# Patient Record
Sex: FEMALE | Race: WHITE | NOT HISPANIC OR LATINO | Employment: FULL TIME | URBAN - METROPOLITAN AREA
[De-identification: names, ages, dates, MRNs, and addresses within clinical notes are randomized per-mention and may not be internally consistent; named-entity substitution may affect disease eponyms.]

---

## 2023-09-01 ENCOUNTER — HOSPITAL ENCOUNTER (OUTPATIENT)
Facility: HOSPITAL | Age: 38
Discharge: HOME | End: 2023-09-01
Attending: STUDENT IN AN ORGANIZED HEALTH CARE EDUCATION/TRAINING PROGRAM
Payer: MEDICAID

## 2023-09-01 VITALS
TEMPERATURE: 97.8 F | DIASTOLIC BLOOD PRESSURE: 90 MMHG | BODY MASS INDEX: 33.13 KG/M2 | HEIGHT: 62 IN | RESPIRATION RATE: 17 BRPM | WEIGHT: 180 LBS | HEART RATE: 67 BPM | SYSTOLIC BLOOD PRESSURE: 131 MMHG | OXYGEN SATURATION: 98 %

## 2023-09-01 DIAGNOSIS — J06.9 UPPER RESPIRATORY TRACT INFECTION, UNSPECIFIED TYPE: Primary | ICD-10-CM

## 2023-09-01 LAB
FLUAV SUBTYP SPEC NAA+PROBE: NOT DETECTED
FLUBV RNA ISLT QL NAA+PROBE: NOT DETECTED
SARS-COV-2 RNA RESP QL NAA+PROBE: NOT DETECTED
STREP A PCR: NOT DETECTED

## 2023-09-01 PROCEDURE — 87636 SARSCOV2 & INF A&B AMP PRB: CPT

## 2023-09-01 PROCEDURE — G0463 HOSPITAL OUTPT CLINIC VISIT: HCPCS

## 2023-09-01 PROCEDURE — 87651 STREP A DNA AMP PROBE: CPT

## 2023-09-01 RX ORDER — ZOLPIDEM TARTRATE 5 MG/1
5 TABLET ORAL NIGHTLY PRN
COMMUNITY

## 2023-09-01 RX ORDER — METHYLPREDNISOLONE 4 MG/1
TABLET ORAL
Qty: 21 TABLET | Refills: 0 | Status: SHIPPED | OUTPATIENT
Start: 2023-09-01

## 2023-09-01 RX ORDER — FLUOXETINE HYDROCHLORIDE 20 MG/1
40 CAPSULE ORAL DAILY
COMMUNITY

## 2023-09-01 RX ORDER — DOXYCYCLINE 100 MG/1
100 CAPSULE ORAL 2 TIMES DAILY
Qty: 14 CAPSULE | Refills: 0 | Status: SHIPPED | OUTPATIENT
Start: 2023-09-01 | End: 2023-09-08

## 2023-09-01 NOTE — FSED PROVIDER NOTE
Subjective   History of Present Illness  Patient 37-year-old female who presents for cough, congestion, ear pain x2 days.  Reports some burning chest pain and sore throat with coughing episodes.  Fever onset today with associated chills and body aches controlled with Tylenol at home.  Some diarrhea.  Denies abdominal pain, chest pain, shortness of breath.  No history of heart or lung disease.      Review of Systems   Constitutional:  Positive for chills and fever.   HENT:  Positive for congestion, ear pain, rhinorrhea and sore throat. Negative for ear discharge.    Eyes:  Negative for visual disturbance.   Respiratory:  Positive for cough and chest tightness. Negative for shortness of breath.    Cardiovascular:  Negative for chest pain and leg swelling.   Gastrointestinal:  Positive for diarrhea. Negative for abdominal pain, constipation, nausea and vomiting.   Musculoskeletal:  Positive for myalgias. Negative for arthralgias and gait problem.   Skin:  Negative for color change, pallor, rash and wound.     Past Medical History:   Diagnosis Date    Anxiety     Depression     Insomnia        No Known Allergies    Past Surgical History:   Procedure Laterality Date    HYSTERECTOMY      TUBAL ABDOMINAL LIGATION         History reviewed. No pertinent family history.    Social History     Socioeconomic History    Marital status: Single   Substance and Sexual Activity    Alcohol use: Yes           Objective   Physical Exam  Constitutional:       General: She is not in acute distress.     Appearance: She is normal weight. She is not ill-appearing, toxic-appearing or diaphoretic.   HENT:      Right Ear: Ear canal and external ear normal.      Left Ear: Tympanic membrane, ear canal and external ear normal.      Ears:      Comments: View of right TM largely obstructed due to copious cerumen.     Nose: Congestion present.      Mouth/Throat:      Mouth: Mucous membranes are moist.      Pharynx: Oropharynx is clear. No  oropharyngeal exudate or posterior oropharyngeal erythema.   Cardiovascular:      Rate and Rhythm: Normal rate and regular rhythm.   Pulmonary:      Effort: Pulmonary effort is normal. No respiratory distress.      Breath sounds: Normal breath sounds. No wheezing.   Musculoskeletal:         General: Normal range of motion.   Skin:     General: Skin is warm and dry.   Neurological:      Mental Status: She is alert.   Psychiatric:         Mood and Affect: Mood normal.         Behavior: Behavior normal.       Procedures           ED Course  ED Course as of 09/01/23 1436   Fri Sep 01, 2023   1348 COVID19: Not Detected [AS]   1348 Influenza A PCR: Not Detected [AS]   1348 Influenza B PCR: Not Detected [AS]   1348 STREP A PCR: Not Detected [AS]      ED Course User Index  [AS] Zabrina Pitt, PA-C                                           Medical Decision Making  Patient is well-appearing 37-year-old female.  Nontoxic.  No acute distress.  Vital signs WNL.  Exam is unremarkable.  History of upper respiratory symptoms and exam with negative COVID, flu, strep is Most consistent with viral upper respiratory infection.  Discussed this with the patient.  Recommended steroid pack for symptoms.  Will prescribe antibiotic if symptoms turn bacterial, such as if the patient has sinus pain or pressure, ear drainage, etc.  We will send patient with printed prescription to use in a few days.  Patient verbalized understanding and was agreeable to plan and discharge.  Answered all questions.  Discussed when to return to the emergency department.    My differential diagnosis for cough includes but is not limited to:  Upper respiratory infection, bronchitis, pneumonia, COPD exacerbation, cough variant asthma, cardiac asthma, coronary artery disease, congestive heart failure, bacterial, viral or lung infections, lung cancer, aspiration pneumonitis, aspiration of foreign body and Covid -19      Problems Addressed:  Upper respiratory tract  infection, unspecified type: complicated acute illness or injury    Amount and/or Complexity of Data Reviewed  Labs:  Decision-making details documented in ED Course.    Risk  Prescription drug management.        Final diagnoses:   Upper respiratory tract infection, unspecified type       ED Disposition  ED Disposition       ED Disposition   Discharge    Condition   Stable    Comment   --               Provider, No Known  [facility] [place] [address]    Schedule an appointment as soon as possible for a visit            Medication List        New Prescriptions      doxycycline 100 MG capsule  Commonly known as: MONODOX  Take 1 capsule by mouth 2 (Two) Times a Day for 7 days.     methylPREDNISolone 4 MG dose pack  Commonly known as: MEDROL  6 tabs PO x1 day, 5 tabs PO x1 day, and continue decrease of 1 tablet/day.  6 days total treatment.               Where to Get Your Medications        These medications were sent to [facility] [address] [place] [address] [place]  [address] [place] [address] - [phone]  - [phone]   [address] [place] [address] [place] [address]      Phone: [phone]   methylPREDNISolone 4 MG dose pack       You can get these medications from any pharmacy    Bring a paper prescription for each of these medications  doxycycline 100 MG capsule

## 2023-09-01 NOTE — DISCHARGE INSTRUCTIONS
Take steroid pack as prescribed until completion for symptoms.  Monitor symptoms for 2 to 3 days, and start antibiotic and take until completion if symptoms worsen or evidence of bacterial infection as discussed.  Return to emergency department for worsening symptoms or other medical emergencies.  Recommended follow-up with PCP.  Refer to the attached instructions for further information.

## 2024-01-08 ENCOUNTER — APPOINTMENT (OUTPATIENT)
Dept: GENERAL RADIOLOGY | Facility: HOSPITAL | Age: 39
End: 2024-01-08
Payer: COMMERCIAL

## 2024-01-08 ENCOUNTER — HOSPITAL ENCOUNTER (EMERGENCY)
Facility: HOSPITAL | Age: 39
Discharge: HOME | End: 2024-01-08
Attending: EMERGENCY MEDICINE | Admitting: EMERGENCY MEDICINE
Payer: COMMERCIAL

## 2024-01-08 VITALS
BODY MASS INDEX: 33.49 KG/M2 | OXYGEN SATURATION: 96 % | HEIGHT: 62 IN | HEART RATE: 82 BPM | DIASTOLIC BLOOD PRESSURE: 82 MMHG | TEMPERATURE: 98.3 F | SYSTOLIC BLOOD PRESSURE: 115 MMHG | RESPIRATION RATE: 18 BRPM | WEIGHT: 182 LBS

## 2024-01-08 DIAGNOSIS — M54.6 ACUTE LEFT-SIDED THORACIC BACK PAIN: Primary | ICD-10-CM

## 2024-01-08 DIAGNOSIS — R07.89 CHEST DISCOMFORT: ICD-10-CM

## 2024-01-08 LAB
ALBUMIN SERPL-MCNC: 4.4 G/DL (ref 3.5–5.2)
ALBUMIN/GLOB SERPL: 1.8 G/DL
ALP SERPL-CCNC: 72 U/L (ref 39–117)
ALT SERPL W P-5'-P-CCNC: 42 U/L (ref 1–33)
ANION GAP SERPL CALCULATED.3IONS-SCNC: 9.8 MMOL/L (ref 5–15)
AST SERPL-CCNC: 20 U/L (ref 1–32)
BASOPHILS # BLD AUTO: 0.02 10*3/MM3 (ref 0–0.2)
BASOPHILS NFR BLD AUTO: 0.4 % (ref 0–1.5)
BILIRUB SERPL-MCNC: 0.3 MG/DL (ref 0–1.2)
BUN SERPL-MCNC: 13 MG/DL (ref 6–20)
BUN/CREAT SERPL: 14.1 (ref 7–25)
CALCIUM SPEC-SCNC: 9.4 MG/DL (ref 8.6–10.5)
CHLORIDE SERPL-SCNC: 105 MMOL/L (ref 98–107)
CO2 SERPL-SCNC: 24.2 MMOL/L (ref 22–29)
CREAT SERPL-MCNC: 0.92 MG/DL (ref 0.57–1)
D DIMER PPP FEU-MCNC: 0.22 MCGFEU/ML (ref 0–0.5)
DEPRECATED RDW RBC AUTO: 39.6 FL (ref 37–54)
EGFRCR SERPLBLD CKD-EPI 2021: 80.9 ML/MIN/1.73
EOSINOPHIL # BLD AUTO: 0.1 10*3/MM3 (ref 0–0.4)
EOSINOPHIL NFR BLD AUTO: 2 % (ref 0.3–6.2)
ERYTHROCYTE [DISTWIDTH] IN BLOOD BY AUTOMATED COUNT: 12.1 % (ref 12.3–15.4)
GLOBULIN UR ELPH-MCNC: 2.4 GM/DL
GLUCOSE SERPL-MCNC: 104 MG/DL (ref 65–99)
HCT VFR BLD AUTO: 39.9 % (ref 34–46.6)
HGB BLD-MCNC: 13.2 G/DL (ref 12–15.9)
HOLD SPECIMEN: NORMAL
HOLD SPECIMEN: NORMAL
IMM GRANULOCYTES # BLD AUTO: 0 10*3/MM3 (ref 0–0.05)
IMM GRANULOCYTES NFR BLD AUTO: 0 % (ref 0–0.5)
LYMPHOCYTES # BLD AUTO: 1.62 10*3/MM3 (ref 0.7–3.1)
LYMPHOCYTES NFR BLD AUTO: 32 % (ref 19.6–45.3)
MCH RBC QN AUTO: 29.5 PG (ref 26.6–33)
MCHC RBC AUTO-ENTMCNC: 33.1 G/DL (ref 31.5–35.7)
MCV RBC AUTO: 89.3 FL (ref 79–97)
MONOCYTES # BLD AUTO: 0.43 10*3/MM3 (ref 0.1–0.9)
MONOCYTES NFR BLD AUTO: 8.5 % (ref 5–12)
NEUTROPHILS NFR BLD AUTO: 2.89 10*3/MM3 (ref 1.7–7)
NEUTROPHILS NFR BLD AUTO: 57.1 % (ref 42.7–76)
PLATELET # BLD AUTO: 200 10*3/MM3 (ref 140–450)
PMV BLD AUTO: 10 FL (ref 6–12)
POTASSIUM SERPL-SCNC: 4.2 MMOL/L (ref 3.5–5.2)
PROT SERPL-MCNC: 6.8 G/DL (ref 6–8.5)
QT INTERVAL: 355 MS
QTC INTERVAL: 434 MS
RBC # BLD AUTO: 4.47 10*6/MM3 (ref 3.77–5.28)
SODIUM SERPL-SCNC: 139 MMOL/L (ref 136–145)
TROPONIN T SERPL HS-MCNC: <6 NG/L
WBC NRBC COR # BLD AUTO: 5.06 10*3/MM3 (ref 3.4–10.8)
WHOLE BLOOD HOLD COAG: NORMAL
WHOLE BLOOD HOLD SPECIMEN: NORMAL

## 2024-01-08 PROCEDURE — 85025 COMPLETE CBC W/AUTO DIFF WBC: CPT | Performed by: EMERGENCY MEDICINE

## 2024-01-08 PROCEDURE — 96374 THER/PROPH/DIAG INJ IV PUSH: CPT

## 2024-01-08 PROCEDURE — 80053 COMPREHEN METABOLIC PANEL: CPT | Performed by: EMERGENCY MEDICINE

## 2024-01-08 PROCEDURE — 93005 ELECTROCARDIOGRAM TRACING: CPT | Performed by: EMERGENCY MEDICINE

## 2024-01-08 PROCEDURE — 84484 ASSAY OF TROPONIN QUANT: CPT | Performed by: EMERGENCY MEDICINE

## 2024-01-08 PROCEDURE — 71045 X-RAY EXAM CHEST 1 VIEW: CPT

## 2024-01-08 PROCEDURE — 85379 FIBRIN DEGRADATION QUANT: CPT | Performed by: EMERGENCY MEDICINE

## 2024-01-08 PROCEDURE — 99284 EMERGENCY DEPT VISIT MOD MDM: CPT

## 2024-01-08 PROCEDURE — 25010000002 KETOROLAC TROMETHAMINE PER 15 MG: Performed by: EMERGENCY MEDICINE

## 2024-01-08 RX ORDER — HYDROCODONE BITARTRATE AND ACETAMINOPHEN 5; 325 MG/1; MG/1
1 TABLET ORAL ONCE
Status: COMPLETED | OUTPATIENT
Start: 2024-01-08 | End: 2024-01-08

## 2024-01-08 RX ORDER — NAPROXEN 500 MG/1
500 TABLET ORAL 2 TIMES DAILY PRN
Qty: 10 TABLET | Refills: 0 | Status: SHIPPED | OUTPATIENT
Start: 2024-01-08 | End: 2024-01-13

## 2024-01-08 RX ORDER — SODIUM CHLORIDE 0.9 % (FLUSH) 0.9 %
10 SYRINGE (ML) INJECTION AS NEEDED
Status: DISCONTINUED | OUTPATIENT
Start: 2024-01-08 | End: 2024-01-08 | Stop reason: HOSPADM

## 2024-01-08 RX ORDER — CYCLOBENZAPRINE HCL 5 MG
5-10 TABLET ORAL 3 TIMES DAILY PRN
Qty: 20 TABLET | Refills: 0 | Status: SHIPPED | OUTPATIENT
Start: 2024-01-08

## 2024-01-08 RX ORDER — KETOROLAC TROMETHAMINE 15 MG/ML
15 INJECTION, SOLUTION INTRAMUSCULAR; INTRAVENOUS ONCE
Status: COMPLETED | OUTPATIENT
Start: 2024-01-08 | End: 2024-01-08

## 2024-01-08 RX ORDER — CYCLOBENZAPRINE HCL 10 MG
10 TABLET ORAL ONCE
Status: COMPLETED | OUTPATIENT
Start: 2024-01-08 | End: 2024-01-08

## 2024-01-08 RX ADMIN — HYDROCODONE BITARTRATE AND ACETAMINOPHEN 1 TABLET: 5; 325 TABLET ORAL at 09:18

## 2024-01-08 RX ADMIN — CYCLOBENZAPRINE 10 MG: 10 TABLET, FILM COATED ORAL at 09:19

## 2024-01-08 RX ADMIN — KETOROLAC TROMETHAMINE 15 MG: 15 INJECTION, SOLUTION INTRAMUSCULAR; INTRAVENOUS at 09:26

## 2024-01-08 NOTE — FSED PROVIDER NOTE
Subjective   History of Present Illness  Patient reports having left scapular and shoulder pain and spasms, worse with certain movements and manipulation of the left shoulder.  This does radiate into her left chest.  She does have a family history of cardiac disease.  She denies shortness of breath.  There is no injury or trauma or known overuse.  This woke her up from sleep at 1 AM.      Review of Systems    Past Medical History:   Diagnosis Date    Anxiety     Depression     Insomnia        No Known Allergies    Past Surgical History:   Procedure Laterality Date    HYSTERECTOMY      TUBAL ABDOMINAL LIGATION         History reviewed. No pertinent family history.    Social History     Socioeconomic History    Marital status: Single   Substance and Sexual Activity    Alcohol use: Yes           Objective   Physical Exam  Vitals and nursing note reviewed.   Constitutional:       Appearance: Normal appearance. She is obese.   HENT:      Head: Normocephalic.      Right Ear: External ear normal.      Left Ear: External ear normal.      Nose: Nose normal.   Eyes:      Conjunctiva/sclera: Conjunctivae normal.   Cardiovascular:      Rate and Rhythm: Normal rate.      Heart sounds: Normal heart sounds.   Pulmonary:      Effort: Pulmonary effort is normal.      Breath sounds: Normal breath sounds.   Abdominal:      General: There is no distension.   Musculoskeletal:      Cervical back: Normal range of motion.      Comments: Passive manipulation of the shoulder worsens her left shoulder girdle pain   Skin:     Findings: No rash.   Neurological:      Mental Status: She is alert and oriented to person, place, and time.   Psychiatric:         Mood and Affect: Mood normal.         Procedures           ED Course                                           Medical Decision Making  Workup looks overall reassuring here in the ER.  Physical exam indicates this is likely musculoskeletal in nature.  She felt better with the meds given here  in the ER including Toradol, Flexeril and a Norco.  She will have a ride home today.  I recommended outpatient follow-up.  We did discuss undergoing more involved cardiac workup through outpatient team if she would like this.    Problems Addressed:  Acute left-sided thoracic back pain: complicated acute illness or injury  Chest discomfort: complicated acute illness or injury    Amount and/or Complexity of Data Reviewed  Labs: ordered.  Radiology: ordered.  ECG/medicine tests: ordered.    Risk  Prescription drug management.        Final diagnoses:   Acute left-sided thoracic back pain   Chest discomfort       ED Disposition  ED Disposition       ED Disposition   Discharge    Condition   Stable    Comment   --               PATIENT CONNECTION - [facility] [address]  [phone]        Kandis Reyes MD  [address] [facility] [place] [address] [phone]      [address]         Medication List        New Prescriptions      cyclobenzaprine 5 MG tablet  Commonly known as: FLEXERIL  Take 1-2 tablets by mouth 3 (Three) Times a Day As Needed for Muscle Spasms.     naproxen 500 MG tablet  Commonly known as: NAPROSYN  Take 1 tablet by mouth 2 (Two) Times a Day As Needed for Mild Pain or Moderate Pain for up to 5 days.               Where to Get Your Medications        These medications were sent to [facility] [address] [place] [address] [place] - [address] [place] [address] - [phone]  - [phone] [address] [place] [address]      Phone: [phone]   cyclobenzaprine 5 MG tablet  naproxen 500 MG tablet

## 2024-01-08 NOTE — Clinical Note
[facility] [place] [address] [place] [address] [place] [address]  Phone: [phone]    Carmela White was seen and treated in our emergency department on 1/8/2024.  She may return to work on 01/10/2024.         Thank you for choosing [facility].    Gildardo Garvey MD

## 2024-01-08 NOTE — ED NOTES
Pt states woke up around 1am with pain to L shoulder blade that radiated into chest.  Pt denies SOA.  Pt denies having previous pain like this in the past.  Pt on placed on continuous cardiac monitor, automatic BP cuff and pulse ox.

## 2024-02-13 ENCOUNTER — TELEPHONE (OUTPATIENT)
Dept: BARIATRICS/WEIGHT MGMT | Facility: CLINIC | Age: 39
End: 2024-02-13
Payer: COMMERCIAL

## 2025-05-28 ENCOUNTER — HOSPITAL ENCOUNTER (OUTPATIENT)
Facility: HOSPITAL | Age: 40
Discharge: HOME | End: 2025-05-28
Attending: EMERGENCY MEDICINE
Payer: MEDICAID

## 2025-05-28 VITALS
WEIGHT: 184.7 LBS | HEART RATE: 86 BPM | TEMPERATURE: 98.4 F | OXYGEN SATURATION: 99 % | RESPIRATION RATE: 16 BRPM | BODY MASS INDEX: 33.99 KG/M2 | DIASTOLIC BLOOD PRESSURE: 84 MMHG | HEIGHT: 62 IN | SYSTOLIC BLOOD PRESSURE: 133 MMHG

## 2025-05-28 DIAGNOSIS — J32.9 CHRONIC SINUSITIS, UNSPECIFIED LOCATION: Primary | ICD-10-CM

## 2025-05-28 DIAGNOSIS — H66.92 LEFT ACUTE OTITIS MEDIA: ICD-10-CM

## 2025-05-28 PROCEDURE — 99214 OFFICE O/P EST MOD 30 MIN: CPT | Performed by: EMERGENCY MEDICINE

## 2025-05-28 PROCEDURE — G0463 HOSPITAL OUTPT CLINIC VISIT: HCPCS | Performed by: EMERGENCY MEDICINE

## 2025-05-28 NOTE — Clinical Note
[facility] [place] [id number] [place] [address] [place] [address]  Phone: [phone]    Carmela White was seen and treated in our emergency department on 5/28/2025.  She may return to work on 05/30/2025.         Thank you for choosing [facility].    Pravin Woods MD

## 2025-05-28 NOTE — FSED PROVIDER NOTE
Subjective   History of Present Illness  Patient is a 39-year-old  female presents emergency room with complaints of sinus pressure and earache.  Patient states that she has had symptoms now for several days.  She reports sinus pressure that backs up into her ear.  Reports that she has had a headache that she cannot shake.        Review of Systems   Constitutional: Negative.  Negative for chills, fatigue and fever.   HENT:  Positive for ear pain and sinus pressure.    Eyes: Negative.    Respiratory:  Negative for cough, chest tightness and shortness of breath.    Cardiovascular:  Negative for chest pain and palpitations.   Gastrointestinal:  Negative for abdominal pain, diarrhea, nausea and vomiting.   Genitourinary: Negative.    Musculoskeletal: Negative.    Skin: Negative.  Negative for rash.   Neurological:  Positive for headaches. Negative for syncope, weakness and numbness.   Psychiatric/Behavioral: Negative.     All other systems reviewed and are negative.      Past Medical History:   Diagnosis Date    Anxiety     Depression     Insomnia        No Known Allergies    Past Surgical History:   Procedure Laterality Date    HYSTERECTOMY      TUBAL ABDOMINAL LIGATION         No family history on file.    Social History     Socioeconomic History    Marital status: Single   Substance and Sexual Activity    Alcohol use: Yes           Objective   Physical Exam  Vitals and nursing note reviewed.   Constitutional:       General: She is not in acute distress.     Appearance: Normal appearance. She is normal weight.   HENT:      Head: Normocephalic.      Right Ear: External ear normal. No middle ear effusion. Tympanic membrane is not erythematous or bulging.      Left Ear: External ear normal. There is impacted cerumen. Tympanic membrane is erythematous.      Nose:      Right Sinus: Maxillary sinus tenderness and frontal sinus tenderness present.      Left Sinus: Maxillary sinus tenderness and frontal sinus  tenderness present.   Cardiovascular:      Rate and Rhythm: Normal rate.   Pulmonary:      Effort: Pulmonary effort is normal.   Musculoskeletal:         General: Normal range of motion.      Cervical back: Normal range of motion.   Skin:     Capillary Refill: Capillary refill takes less than 2 seconds.   Neurological:      General: No focal deficit present.      Mental Status: She is alert and oriented to person, place, and time.   Psychiatric:         Mood and Affect: Mood normal.         Procedures           ED Course                                           Medical Decision Making  The patient presents emergency room with specific unilateral ear pain sinus pressure.  See HPI for complete details.  Differential diagnosis includes otitis media, otitis externa.  Also consideration given to influenza, COVID-19 and/or other viral illnesses causing pain to the ear.  The ear was examined and the TM was without perforation.  Swabs are not needed..  Patient was treated with antibiotics.  PCP/ENT follow-up recommended.      Problems Addressed:  Chronic sinusitis, unspecified location: complicated acute illness or injury  Left acute otitis media: complicated acute illness or injury    Risk  Prescription drug management.        Final diagnoses:   Chronic sinusitis, unspecified location   Left acute otitis media       ED Disposition  ED Disposition       ED Disposition   Discharge    Condition   Stable    Comment   --               Carli Godoy, APRN  [address] [facility] [place] [address]  [phone]    Schedule an appointment as soon as possible for a visit in 10 days  For repeat evaluation         Medication List        New Prescriptions      amoxicillin-clavulanate 875-125 MG per tablet  Commonly known as: AUGMENTIN  Take 1 tablet by mouth 2 (Two) Times a Day for 10 days.               Where to Get Your Medications        These medications were sent to [facility]/pharmacy #[id number] [facility], [place] - [address]  [place] - [phone] [facility] [phone]   [address] [place] [address]      Hours: 24-hours Phone: [phone]   amoxicillin-clavulanate 875-125 MG per tablet

## 2025-05-28 NOTE — DISCHARGE INSTRUCTIONS
Please notify patient. Her insurance does not approve Zepbound unless her BMI is greater than 40. We could look into one of the other pill options that could be cheaper like Contrave/Qsymia   Take antibiotics as prescribed for your condition.  Return to the emergency room for any emergent concerns.  Follow-up with primary care physician for continued care.

## 2025-06-11 ENCOUNTER — TRANSCRIBE ORDERS (OUTPATIENT)
Dept: ADMINISTRATIVE | Facility: HOSPITAL | Age: 40
End: 2025-06-11
Payer: MEDICAID

## 2025-06-11 DIAGNOSIS — Z12.31 BREAST CANCER SCREENING BY MAMMOGRAM: Primary | ICD-10-CM

## 2025-06-13 LAB
NCCN CRITERIA FLAG: NORMAL
TYRER CUZICK SCORE: 5.3

## 2025-06-18 ENCOUNTER — HOSPITAL ENCOUNTER (OUTPATIENT)
Dept: MAMMOGRAPHY | Facility: HOSPITAL | Age: 40
Discharge: HOME | End: 2025-06-18
Admitting: NURSE PRACTITIONER
Payer: MEDICAID

## 2025-06-18 DIAGNOSIS — Z12.31 BREAST CANCER SCREENING BY MAMMOGRAM: ICD-10-CM

## 2025-06-18 PROCEDURE — 77067 SCR MAMMO BI INCL CAD: CPT

## 2025-06-18 PROCEDURE — 77063 BREAST TOMOSYNTHESIS BI: CPT
